# Patient Record
Sex: FEMALE | Race: BLACK OR AFRICAN AMERICAN | Employment: OTHER | URBAN - METROPOLITAN AREA
[De-identification: names, ages, dates, MRNs, and addresses within clinical notes are randomized per-mention and may not be internally consistent; named-entity substitution may affect disease eponyms.]

---

## 2021-03-22 ENCOUNTER — APPOINTMENT (OUTPATIENT)
Dept: CT IMAGING | Facility: CLINIC | Age: 76
End: 2021-03-22
Attending: EMERGENCY MEDICINE
Payer: MEDICAID

## 2021-03-22 ENCOUNTER — HOSPITAL ENCOUNTER (OUTPATIENT)
Facility: CLINIC | Age: 76
Setting detail: OBSERVATION
Discharge: HOME | End: 2021-03-25
Attending: EMERGENCY MEDICINE | Admitting: EMERGENCY MEDICINE
Payer: MEDICAID

## 2021-03-22 ENCOUNTER — APPOINTMENT (OUTPATIENT)
Dept: GENERAL RADIOLOGY | Facility: CLINIC | Age: 76
End: 2021-03-22
Attending: EMERGENCY MEDICINE
Payer: MEDICAID
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
## 2021-03-22 DIAGNOSIS — M25.561 ACUTE PAIN OF RIGHT KNEE: ICD-10-CM

## 2021-03-22 DIAGNOSIS — I10 BENIGN ESSENTIAL HYPERTENSION: Primary | ICD-10-CM

## 2021-03-22 DIAGNOSIS — R55 SYNCOPE, UNSPECIFIED SYNCOPE TYPE: ICD-10-CM

## 2021-03-22 DIAGNOSIS — R53.1 GENERALIZED WEAKNESS: ICD-10-CM

## 2021-03-22 DIAGNOSIS — S09.90XA CLOSED HEAD INJURY, INITIAL ENCOUNTER: ICD-10-CM

## 2021-03-22 DIAGNOSIS — F32.3 SEVERE MAJOR DEPRESSION WITH PSYCHOTIC FEATURES (H): ICD-10-CM

## 2021-03-22 DIAGNOSIS — F33.3 SEVERE RECURRENT DEPRESSION WITH PSYCHOSIS (H): ICD-10-CM

## 2021-03-22 PROBLEM — H47.012 ISCHEMIC OPTIC NEUROPATHY OF LEFT EYE: Status: ACTIVE | Noted: 2017-02-24

## 2021-03-22 PROBLEM — M54.40 CHRONIC LOW BACK PAIN WITH SCIATICA: Status: ACTIVE | Noted: 2017-04-21

## 2021-03-22 PROBLEM — G89.29 CHRONIC LOW BACK PAIN WITH SCIATICA: Status: ACTIVE | Noted: 2017-04-21

## 2021-03-22 PROBLEM — R32 URINARY INCONTINENCE: Status: ACTIVE | Noted: 2017-04-21

## 2021-03-22 PROBLEM — D32.9 MENINGIOMA (H): Status: ACTIVE | Noted: 2017-01-19

## 2021-03-22 PROBLEM — H47.292: Status: ACTIVE | Noted: 2017-02-24

## 2021-03-22 LAB
ALBUMIN SERPL-MCNC: 3.6 G/DL (ref 3.4–5)
ALBUMIN UR-MCNC: NEGATIVE MG/DL
ALP SERPL-CCNC: 64 U/L (ref 40–150)
ALT SERPL W P-5'-P-CCNC: 29 U/L (ref 0–50)
ANION GAP SERPL CALCULATED.3IONS-SCNC: 7 MMOL/L (ref 3–14)
APPEARANCE UR: CLEAR
AST SERPL W P-5'-P-CCNC: 40 U/L (ref 0–45)
BASOPHILS # BLD AUTO: 0 10E9/L (ref 0–0.2)
BASOPHILS NFR BLD AUTO: 0.6 %
BILIRUB SERPL-MCNC: 0.3 MG/DL (ref 0.2–1.3)
BILIRUB UR QL STRIP: NEGATIVE
BUN SERPL-MCNC: 10 MG/DL (ref 7–30)
CALCIUM SERPL-MCNC: 8.6 MG/DL (ref 8.5–10.1)
CHLORIDE SERPL-SCNC: 107 MMOL/L (ref 94–109)
CO2 SERPL-SCNC: 25 MMOL/L (ref 20–32)
COLOR UR AUTO: ABNORMAL
CREAT SERPL-MCNC: 0.46 MG/DL (ref 0.52–1.04)
DIFFERENTIAL METHOD BLD: NORMAL
EOSINOPHIL # BLD AUTO: 0.1 10E9/L (ref 0–0.7)
EOSINOPHIL NFR BLD AUTO: 2.3 %
ERYTHROCYTE [DISTWIDTH] IN BLOOD BY AUTOMATED COUNT: 13.2 % (ref 10–15)
GFR SERPL CREATININE-BSD FRML MDRD: >90 ML/MIN/{1.73_M2}
GLUCOSE SERPL-MCNC: 159 MG/DL (ref 70–99)
GLUCOSE UR STRIP-MCNC: NEGATIVE MG/DL
HCT VFR BLD AUTO: 37.4 % (ref 35–47)
HGB BLD-MCNC: 12 G/DL (ref 11.7–15.7)
HGB UR QL STRIP: NEGATIVE
IMM GRANULOCYTES # BLD: 0 10E9/L (ref 0–0.4)
IMM GRANULOCYTES NFR BLD: 0.4 %
INTERPRETATION ECG - MUSE: NORMAL
KETONES UR STRIP-MCNC: NEGATIVE MG/DL
LABORATORY COMMENT REPORT: NORMAL
LEUKOCYTE ESTERASE UR QL STRIP: NEGATIVE
LYMPHOCYTES # BLD AUTO: 1.2 10E9/L (ref 0.8–5.3)
LYMPHOCYTES NFR BLD AUTO: 24.7 %
MAGNESIUM SERPL-MCNC: 2.4 MG/DL (ref 1.6–2.3)
MCH RBC QN AUTO: 27.4 PG (ref 26.5–33)
MCHC RBC AUTO-ENTMCNC: 32.1 G/DL (ref 31.5–36.5)
MCV RBC AUTO: 85 FL (ref 78–100)
MONOCYTES # BLD AUTO: 0.6 10E9/L (ref 0–1.3)
MONOCYTES NFR BLD AUTO: 11.9 %
NEUTROPHILS # BLD AUTO: 2.9 10E9/L (ref 1.6–8.3)
NEUTROPHILS NFR BLD AUTO: 60.1 %
NITRATE UR QL: NEGATIVE
NRBC # BLD AUTO: 0 10*3/UL
NRBC BLD AUTO-RTO: 0 /100
PH UR STRIP: 7 PH (ref 5–7)
PLATELET # BLD AUTO: 255 10E9/L (ref 150–450)
POTASSIUM SERPL-SCNC: 4.5 MMOL/L (ref 3.4–5.3)
PROT SERPL-MCNC: 7.8 G/DL (ref 6.8–8.8)
RBC # BLD AUTO: 4.38 10E12/L (ref 3.8–5.2)
RBC #/AREA URNS AUTO: 5 /HPF (ref 0–2)
SARS-COV-2 RNA RESP QL NAA+PROBE: NEGATIVE
SODIUM SERPL-SCNC: 139 MMOL/L (ref 133–144)
SOURCE: ABNORMAL
SP GR UR STRIP: 1.01 (ref 1–1.03)
SPECIMEN SOURCE: NORMAL
SQUAMOUS #/AREA URNS AUTO: 0 /HPF (ref 0–1)
TROPONIN I SERPL-MCNC: <0.015 UG/L (ref 0–0.04)
UROBILINOGEN UR STRIP-MCNC: 0 MG/DL (ref 0–2)
WBC # BLD AUTO: 4.9 10E9/L (ref 4–11)
WBC #/AREA URNS AUTO: 4 /HPF (ref 0–5)

## 2021-03-22 PROCEDURE — 84484 ASSAY OF TROPONIN QUANT: CPT | Performed by: EMERGENCY MEDICINE

## 2021-03-22 PROCEDURE — 83735 ASSAY OF MAGNESIUM: CPT | Performed by: EMERGENCY MEDICINE

## 2021-03-22 PROCEDURE — 250N000013 HC RX MED GY IP 250 OP 250 PS 637: Performed by: STUDENT IN AN ORGANIZED HEALTH CARE EDUCATION/TRAINING PROGRAM

## 2021-03-22 PROCEDURE — G0378 HOSPITAL OBSERVATION PER HR: HCPCS

## 2021-03-22 PROCEDURE — 99285 EMERGENCY DEPT VISIT HI MDM: CPT | Mod: 25

## 2021-03-22 PROCEDURE — 81001 URINALYSIS AUTO W/SCOPE: CPT | Performed by: EMERGENCY MEDICINE

## 2021-03-22 PROCEDURE — 87635 SARS-COV-2 COVID-19 AMP PRB: CPT | Performed by: EMERGENCY MEDICINE

## 2021-03-22 PROCEDURE — 80053 COMPREHEN METABOLIC PANEL: CPT | Performed by: EMERGENCY MEDICINE

## 2021-03-22 PROCEDURE — 99220 PR INITIAL OBSERVATION CARE,LEVEL III: CPT | Performed by: STUDENT IN AN ORGANIZED HEALTH CARE EDUCATION/TRAINING PROGRAM

## 2021-03-22 PROCEDURE — 85025 COMPLETE CBC W/AUTO DIFF WBC: CPT | Performed by: EMERGENCY MEDICINE

## 2021-03-22 PROCEDURE — 70450 CT HEAD/BRAIN W/O DYE: CPT

## 2021-03-22 PROCEDURE — C9803 HOPD COVID-19 SPEC COLLECT: HCPCS

## 2021-03-22 PROCEDURE — 73562 X-RAY EXAM OF KNEE 3: CPT | Mod: RT

## 2021-03-22 PROCEDURE — 93005 ELECTROCARDIOGRAM TRACING: CPT

## 2021-03-22 RX ORDER — IBUPROFEN 600 MG/1
600 TABLET, FILM COATED ORAL EVERY 6 HOURS PRN
COMMUNITY

## 2021-03-22 RX ORDER — ONDANSETRON 4 MG/1
4 TABLET, ORALLY DISINTEGRATING ORAL EVERY 6 HOURS PRN
Status: DISCONTINUED | OUTPATIENT
Start: 2021-03-22 | End: 2021-03-25 | Stop reason: HOSPADM

## 2021-03-22 RX ORDER — ACETAMINOPHEN 325 MG/1
650 TABLET ORAL EVERY 4 HOURS PRN
Status: DISCONTINUED | OUTPATIENT
Start: 2021-03-22 | End: 2021-03-25 | Stop reason: HOSPADM

## 2021-03-22 RX ORDER — ACETAMINOPHEN 650 MG/1
650 SUPPOSITORY RECTAL EVERY 4 HOURS PRN
Status: DISCONTINUED | OUTPATIENT
Start: 2021-03-22 | End: 2021-03-25 | Stop reason: HOSPADM

## 2021-03-22 RX ORDER — NITROGLYCERIN 0.4 MG/1
0.4 TABLET SUBLINGUAL EVERY 5 MIN PRN
Status: DISCONTINUED | OUTPATIENT
Start: 2021-03-22 | End: 2021-03-25 | Stop reason: HOSPADM

## 2021-03-22 RX ORDER — CHLORAL HYDRATE 500 MG
1 CAPSULE ORAL DAILY
COMMUNITY

## 2021-03-22 RX ORDER — GABAPENTIN 300 MG/1
300 CAPSULE ORAL 3 TIMES DAILY
COMMUNITY

## 2021-03-22 RX ORDER — AMLODIPINE BESYLATE 5 MG/1
5 TABLET ORAL DAILY
Status: DISCONTINUED | OUTPATIENT
Start: 2021-03-22 | End: 2021-03-25

## 2021-03-22 RX ORDER — CHOLECALCIFEROL (VITAMIN D3) 50 MCG
1 TABLET ORAL DAILY
COMMUNITY

## 2021-03-22 RX ORDER — LIDOCAINE 40 MG/G
CREAM TOPICAL
Status: DISCONTINUED | OUTPATIENT
Start: 2021-03-22 | End: 2021-03-25 | Stop reason: HOSPADM

## 2021-03-22 RX ORDER — ONDANSETRON 2 MG/ML
4 INJECTION INTRAMUSCULAR; INTRAVENOUS EVERY 6 HOURS PRN
Status: DISCONTINUED | OUTPATIENT
Start: 2021-03-22 | End: 2021-03-25 | Stop reason: HOSPADM

## 2021-03-22 RX ADMIN — AMLODIPINE BESYLATE 5 MG: 5 TABLET ORAL at 19:41

## 2021-03-22 RX ADMIN — ACETAMINOPHEN 650 MG: 325 TABLET, FILM COATED ORAL at 17:25

## 2021-03-22 ASSESSMENT — ENCOUNTER SYMPTOMS
FEVER: 0
SHORTNESS OF BREATH: 0
CONFUSION: 1
MYALGIAS: 1
VOMITING: 0
DIARRHEA: 0
SEIZURES: 0

## 2021-03-22 NOTE — PLAN OF CARE
Observation goals PRIOR TO DISCHARGE     Comments: List all  goals to be met before discharge:   - Diagnostic tests and consults completed and resulted: NOT MET, ECHO scheduled    - No further episodes of syncope and any new arrhythmia addressed with controlled heart rates: NOT MET, Tele: SR  - Vital signs normal or at patient baseline and orthostatic vitals are normal and patient not lightheaded with standing:NOT MET   orthostatic BP abnormal  Lying 163/91 Pulse 65  Sitting 157/89 Pulse 63   Standing 201/107 Pulse 71  Reports dizziness when standing    - Tolerating oral intake to maintain hydration: MET    - Safe disposition plan has been identified: NOT MET   - Nurse to notify provider when observation goals have been met and patient is ready for discharge.

## 2021-03-22 NOTE — PROGRESS NOTES
RECEIVING UNIT ED HANDOFF REVIEW    ED Nurse Handoff Report was reviewed by: Etta Gonzalez RN on March 22, 2021 at 2:03 PM

## 2021-03-22 NOTE — ED TRIAGE NOTES
Comes from airport, plan to fly to [facility] today. Fell out of wheelchair, syncopal episode per daughter report. Pt was in WC when EMS arrived. , VSS on route.

## 2021-03-22 NOTE — ED PROVIDER NOTES
"  History   Chief Complaint:  Loss of Consciousness       The history is provided by the patient and a relative. The history is limited by a language barrier. A  was used.      Felton Kenny is a 75 year old female with history of blindness in left eye, chronic back pain, and depression who presents after a syncopal episode. The patient states that she was at the airport about the fly to [facility] when she had a syncopal episode while in a wheel chair. She did not hit her head or fall to the ground. She complains that she \"did not know where she went\" and \"her eye could not see\". Unclear which eye it was. She does have a history of blindness in her left eye.  Felton's daughter reports that she had a similar episode three days ago, where she injured her right knee and hit her head. She lost bladder control today. The daughter states that she is frequently \"possessed by the devil during theses spells\" and is supposed to follow with a neurologist, but has not treated with them in three years. Felton denies any seizure, fever, cough, diarrhea, or shortness of breath. She is not anticoagulated. Patient language is Marielos.     Review of Systems   Constitutional: Negative for fever.   Eyes: Positive for visual disturbance.   Respiratory: Negative for shortness of breath.    Gastrointestinal: Negative for diarrhea and vomiting.   Musculoskeletal: Positive for myalgias.   Neurological: Positive for syncope. Negative for seizures.   Psychiatric/Behavioral: Positive for confusion.   All other systems reviewed and are negative.    Allergies:  No known drug allergies     Medications:  Zoloft   Risperdal   Vitamin D3  Oxybutynin   Drisdol   Gabapentin     Past Medical History:    Depression   Blindness in left eye   Urinary incontinence   Vitamin d deficiency   Chronic back pain   Chronic constipation     Social History:  Presents to emergency department with daughter   Speaks [place]     Physical Exam "     Patient Vitals for the past 24 hrs:   BP Temp Temp src Pulse Resp SpO2   03/22/21 1300 (!) 158/92 -- -- 67 -- 97 %   03/22/21 1230 (!) 165/100 -- -- 67 20 96 %   03/22/21 1200 (!) 158/105 -- -- 73 16 98 %   03/22/21 1130 (!) 160/89 -- -- 66 19 98 %   03/22/21 1100 (!) 142/89 -- -- 66 21 99 %   03/22/21 1030 (!) 159/104 -- -- 66 19 97 %   03/22/21 1000 (!) 147/89 -- -- 64 20 96 %   03/22/21 0930 (!) 146/92 -- -- 68 18 96 %   03/22/21 0900 (!) 166/91 -- -- 67 22 98 %   03/22/21 0802 -- 98.3  F (36.8  C) Temporal -- -- --   03/22/21 0800 (!) 158/90 -- -- 70 21 98 %   03/22/21 0755 (!) 158/90 -- -- 72 24 98 %       Physical Exam  General: Well-nourished, appears to be resting comfortably when I enter the room  Eyes: PERRL, conjunctivae pink no scleral icterus or conjunctival injection  ENT:  Moist mucus membranes, posterior oropharynx clear without erythema or exudates  Respiratory:  Lungs clear to auscultation bilaterally, no crackles/rubs/wheezes.  Good air movement  CV: Normal rate and rhythm, no murmurs/rubs/gallops  GI:  Abdomen soft and non-distended.  Normoactive BS.  No tenderness, guarding or rebound  Skin: Warm, dry.  No rashes or petechiae  Musculoskeletal: No peripheral edema or calf tenderness  Neuro: Alert and oriented to person/place/time. PERRL, EOMI no nystagmus, no aphasia/facial droop/dysarthria, tongue midline, symmetric palatal elevation, normal strength at SCM/trapezius/BUE/BLE, normal coordination to FNF at BUE, gait deferred negative romberg, sensation intact to LT over face/BUE/BLE  Psychiatric: Normal affect      Emergency Department Course   ECG  ECG taken at 08:58:26, ECG read at 0929  Normal sinus rhythm, minimal voltage criteria for LVH, may be normal variant. Borderline ECG.   Rate 68 bpm. AR interval 192 ms. QRS duration 90 ms. QT/QTc 446/474 ms. P-R-T axes 27 38 52.     Imaging:  Head CT w/o contrast  IMPRESSION:   1. 2.5 cm rim calcified nodule arising from the base of the  skull  between the left sphenoid ridge and left anterior clinoid process.  While this most likely represents a partially calcified meningioma,  rim calcified large cerebral artery aneurysm cannot be completely  excluded.  2. Diffuse cerebral volume loss and cerebral white matter changes  consistent with chronic small vessel ischemic disease. No evidence for  acute intracranial pathology.   As read by Radiology.     XR Knee Right 3 Views  IMPRESSION:  1.  No fracture or joint malalignment.  2.  Mild-moderate right knee degenerative arthrosis. This includes  mild medial compartment narrowing, medial compartment osteophytosis,  and patellofemoral compartment osteophytosis.  3.  Small knee joint effusion.  4.  Bone demineralization.  As read by Radiology.     Laboratory:  CBC: WBC: 4.9, HGB: 12.0, PLT: 255  XMP: Glucose 159 (H), Creatinine: 0.46 (L) o/w WNL  UA: unable to obtain  Troponin (Collected 0800): <0.015  Magnesium: 2.4 (H)   Asymptomatic COVID-19 Virus by PCR Nasopharyngeal swab: pending     Emergency Department Course:    Reviewed:  I reviewed nursing notes, vitals, past medical history and care everywhere    Assessments:  0743 I obtained history and examined the patient as noted above.   1150 I rechecked the patient. Her daughter was not in the room.   1229 I rechecked the patient.     Consults:   1259 I discussed the patient with Dr. Lipscomb, hospitalist, who accepted the patient     Disposition:  The patient was admitted to the hospital under the care of Dr. Lipscomb.     Impression & Plan     Medical Decision Making:  Felton Kenny is a 75 year old female who presents with syncope and loss of bladder control. Her workup is reassuring but given her age as risk factor for a malignant cause of syncope along with difficulties with translation and obtaining a good history, we will admit for observation to be sure no malignant arrhthymias or other concerning symptoms.  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 No other intracranial hemorrhage or skull fractures. No apparent fractures. Per her daughter, she hasn't gone to the doctor in years and wants to establish care with a new doctor. Dr. Lipscomb accepted the patient to his service.    Covid-19  Felton Kenny was evaluated during a global COVID-19 pandemic, which necessitated consideration that the patient might be at risk for infection with the SARS-CoV-2 virus that causes COVID-19.   Applicable protocols for evaluation were followed during the patient's care.   COVID-19 was considered as part of the patient's evaluation. The plan for testing is:  a test was obtained during this visit.    Diagnosis:    ICD-10-CM    1. Syncope, unspecified syncope type  R55 Asymptomatic SARS-CoV-2 COVID-19 Virus (Coronavirus) by PCR   2. Fall, initial encounter  W19.XXXA    3. Closed head injury, initial encounter  S09.90XA    4. Acute pain of right knee  M25.561        Discharge Medications:  New Prescriptions    No medications on file       Scribe Disclosure:  Blanca MIXON, am serving as a scribe at 7:43 AM on 3/22/2021 to document services personally performed by Marianna Taylor MD based on my observations and the provider's statements to me.          Marianna Taylor MD  03/22/21 [id number]

## 2021-03-22 NOTE — LETTER
"Transition Communication Hand-off for Care Transitions to Next Level of Care Provider    Name: Felton Kenny  : 1945  MRN #: [id number]  Primary Care Provider: Estee Yeager     Primary Clinic: [facility] [address] [place] [address]     Reason for Hospitalization:  Closed head injury, initial encounter [S09.90XA]  Fall, initial encounter [W19.XXXA]  Acute pain of right knee [M25.561]  Syncope, unspecified syncope type [R55]  Admit Date/Time: 3/22/2021  7:38 AM  Discharge Date: 3/25/2021  Payor Source: No coverage found.             Reason for Communication Hand-off Referral: Other Needs f/u on Home Care Referral:  The pt was not accepted by [facility] due to her mental health issues.  The pt's son-in-law is aware of this and the fact we are placing other referral for home care.  I am sending a handoff to the pt's PCP to help f/u with home care referrals if needed.  I faxed a referral to [facility] and spoke with Farida  [phone] and fax [phone].  I have not hear back from her and I LVM with her and faxed the home care Referral Discharge via [employer].    The pt is discharging to home with her TWIN.  Her dtg who flew to [facility] at the time this pt had her [place] the [place] has not called me.  The plan was for her to call me and update me on \"home Care/PCA\" services the TWIN is stating the pt was receiving up until a year ago.  He states these services stopped because of covid.  As stated in my other not the TWIN, the pt's PCP, and no other family members except her dtg who flew to [place] know who provided these services.    Discharge Plan:  Discharge Plan:      Most Recent Value   Disposition Comments  The pt's dtg that manages her cares and knows the pt's  and insurance is in [facility].           Concern for non-adherence with plan of care:   Y/N yes  Discharge Needs Assessment:  Needs      Most Recent Value   Equipment Currently Used at Home  walker, rolling, " "wheelchair, manual   # of Referrals Placed by CTS  Communication hand-offs to next level of Care Providers, Financial Services, Homecare            Follow-up specialty is recommended: No    Follow-up plan:  No future appointments.    Any outstanding tests or procedures:        Referrals     Future Labs/Procedures    Home care nursing referral     Comments:    RN skilled nursing visit. RN to assess vital signs and weight, respiratory and cardiac status, hydration, nutrition and bowel status and home safety.    Your provider has ordered home care nursing services. If you have not been contacted within 2 days of your discharge please call the inpatient department phone number at [phone] .    Home Care PT Referral for Hospital Discharge     Comments:    PT to eval and treat    Your provider has ordered home care - physical therapy. If you have not been contacted within 2 days of your discharge please call the department phone number listed on the top of this document.            Key Recommendations:   As stated above needs follow through with home care referral.  The pt was not accepted by [facility] due to her mental health issues.  The pt's son-in-law is aware of this and the fact we are placing other referral for home care.  I am sending a handoff to the pt's PCP to help f/u with home care referrals if needed.  I faxed a referral to [facility] and spoke with Farida,  [phone] and fax [phone].  I have not hear back from her and I LVM with her and faxed the home care Referral Discharge via [employer].    The pt is discharging to home with her TWIN.  Her dtg who flew to [facility] at the time this pt [place] her [place] has not called me.  The plan was for her to call me and update me on \"home Care/PCA\" services the TWIN is stating the pt was receiving up until a year ago.  He states these services stopped because of covid.  As stated in my other not the TWIN, the pt's PCP, and no other family members " except her dtg who flew to [place] know who provided these services.      Thank you,  Care Coordination    Juana Veloz RN, CC    AVS/Discharge Summary is the source of truth; this is a helpful guide for improved communication of patient story

## 2021-03-22 NOTE — ED NOTES
[facility]  ED Nurse Handoff Report    ED Chief complaint: Loss of Consciousness      ED Diagnosis:   Final diagnoses:   None       Code Status: To be addressed by admitting provider    Allergies: No Known Allergies    Patient Story: syncopal episode from WC to ground at airport, was planning on flying to [place]. .     Focused Assessment: Pt speaks Ruddy, daughter assisted w/intepretation alongside  service. Denies any neuro symptoms in ED. Baseline wheelchair use. Hypertensive in ED, otherwise VSS. Calm, cooperative and resting on cart.    Labs Ordered and Resulted from Time of ED Arrival Up to the Time of Departure from the ED   COMPREHENSIVE METABOLIC PANEL - Abnormal; Notable for the following components:       Result Value    Glucose 159 (*)     Creatinine 0.46 (*)     All other components within normal limits   MAGNESIUM - Abnormal; Notable for the following components:    Magnesium 2.4 (*)     All other components within normal limits   CBC WITH PLATELETS DIFFERENTIAL   TROPONIN I   ROUTINE UA WITH MICROSCOPIC REFLEX TO CULTURE   VITAL SIGNS   PULSE OXIMETRY NURSING   CARDIAC CONTINUOUS MONITORING   PERIPHERAL IV CATHETER     CT Head w/o Contrast   Final Result   IMPRESSION:    1. 2.5 cm rim calcified nodule arising from the base of the skull   between the left sphenoid ridge and left anterior clinoid process.   While this most likely represents a partially calcified meningioma,   rim calcified large cerebral artery aneurysm cannot be completely   excluded.   2. Diffuse cerebral volume loss and cerebral white matter changes   consistent with chronic small vessel ischemic disease. No evidence for   acute intracranial pathology.                   KEELEY TOLBERT MD      XR Knee Right 3 Views   Final Result   IMPRESSION:   1.  No fracture or joint malalignment.   2.  Mild-moderate right knee degenerative arthrosis. This includes   mild medial compartment narrowing, medial  compartment osteophytosis,   and patellofemoral compartment osteophytosis.   3.  Small knee joint effusion.   4.  Bone demineralization.      GIUSEPPE CALDWELL MD           Treatments and/or interventions provided: labs, XR knee, CT head  Patient's response to treatments and/or interventions: resting    To be done/followed up on inpatient unit:  Continue with plan of care per admitting MD.    Does this patient have any cognitive concerns?: None    Activity level - Baseline/Home:  Wheelchair  Activity Level - Current:   Not assessed in ED    Patient's Preferred language: Tigrina   Needed?: No    Isolation: None  Infection: Not Applicable  Patient tested for COVID 19 prior to admission: YES  Bariatric?: No    Vital Signs:   Vitals:    03/22/21 1100 03/22/21 1130 03/22/21 1200 03/22/21 1230   BP: (!) 142/89 (!) 160/89 (!) 158/105 (!) 165/100   Pulse: 66 66 73 67   Resp: 21 19 16 20   Temp:       TempSrc:       SpO2: 99% 98% 98% 96%       Cardiac Rhythm:     Was the PSS-3 completed:   Yes  What interventions are required if any?               Family Comments: Daughter at bedside  OBS brochure/video discussed/provided to patient/family: Yes, daughter       For the majority of the shift this patient's behavior was Green.   Behavioral interventions performed were NA.    ED NURSE PHONE NUMBER: *[phone]

## 2021-03-22 NOTE — PHARMACY-ADMISSION MEDICATION HISTORY
Pharmacy Medication History  Admission medication history interview status for the 3/22/2021  admission is complete. See EPIC admission navigator for prior to admission medications     Location of Interview: Phone  Medication history sources: Patient and Care Everywhere    Significant changes made to the medication list:      In the past week, patient estimated taking medication this percent of the time: less than 50%    Additional medication history information:   --  Pt is supposed to be on oxybutynin XL 5 mg po every day, sertraline 50 mg po every day, risperidone 2 mg po at bedtime and gabapentin 300 mg po TID (per [employer] records and confirmed w/ pt's daughter).  However, she had stopped taking these ~ 1 year ago b/c she no longer has insurance.  She is still able to to take gabapentin b/c her daughter is on the same medication and is sharing with her.  Pt still has ibuprofen 600 mg prescribed from [facility].    --  Pt also uses a cream for pain but her daughter is unsure of the name.  She will update us when able (she has it at home).    Medication reconciliation completed by provider prior to medication history? No    Time spent in this activity: 30 minutes    Prior to Admission medications    Medication Sig Last Dose Taking? Auth Provider   diphenhydrAMINE-acetaminophen (TYLENOL PM)  MG tablet Take 1 tablet by mouth At Bedtime Past Week Yes Unknown, Entered By History   fish oil-omega-3 fatty acids 1000 MG capsule Take 1 g by mouth daily Past Week Yes Unknown, Entered By History   gabapentin (NEURONTIN) 300 MG capsule Take 300 mg by mouth 3 times daily Past Week Yes Unknown, Entered By History   ibuprofen (ADVIL/MOTRIN) 600 MG tablet Take 600 mg by mouth every 6 hours as needed for moderate pain prn Yes Unknown, Entered By History   vitamin D3 (CHOLECALCIFEROL) 50 mcg (2000 units) tablet Take 1 tablet by mouth daily Past Week Yes Unknown, Entered By History       The information provided in  this note is only as accurate as the sources available at the time of update(s)

## 2021-03-22 NOTE — H&P
[facility]    History and Physical - Hospitalist Service       Date of Admission:  3/22/2021    Assessment & Plan   Felton Kenny is a 75 year old female admitted on 3/22/2021. She presents with syncope.       Syncope, unspecified syncope type    Assessment: Presents with episode of [place] the airport [place] the [place] of admission.  Work-up on admission largely benign, with an EKG that shows normal sinus rhythm, her CT head was without any acute intracranial pathology.  Hemodynamically stable. Suspect likely vaso-vagal episode. Low suspicion for seizure. ACS unlikely.     Plan:   - admit to observation  - Telemetry  - ECHO  - Check orthostatics  - Follow vitals/temp    HTN  Assessment/Plan: start norvasc 5 mg, SBPs consistently very high here.      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  Entered: Raji Lipscomb MD 03/22/2021, 7:54 PM     The patient's care was discussed with the Patient and ED Provider.    Raji Lipscomb MD  [facility]  Contact information available via [facility] Paging/Directory      ______________________________________________________________________    Chief Complaint     Syncope    History is obtained from the patient    History of Present Illness   Felton Kenny is a 75 year old female with past medical history of left eye blindness, chronic back pain, major depression who presents for an episode of  syncope.    Patient was at the airport today when she was certified to [facility] when she had a syncopal episode while being in a wheelchair.  She did not fall to the ground, there was no head trauma.  She reported that following her episode, she did not know where she was for a brief second.  She does have a history of blindness in her left eye.  Her daughter reports that the patient had an similar episode roughly 3 days ago.  She did sustain a fall at that time and did hit her head.  Otherwise during her syncopal episode, she did lose control of her bladder.  Otherwise there was no reports of any seizure-like activity, no recent fevers or chills by the patient, no nausea/vomiting abdominal pain.  She not on anticoagulation.  She denies any chest pain at this time.  She has no other complaints this time.    Review of Systems    The 10 point Review of Systems is negative other than noted in the HPI or here.     Past Medical History    I have reviewed this patient's medical history and updated it with pertinent information if needed.   Past Medical History:   Diagnosis Date     Benign essential hypertension      Meningioma (H)        Past Surgical History   I have reviewed this patient's surgical history and updated it with pertinent information if needed.  No past surgical history on file.    Social History   I have reviewed this patient's social history and updated it with pertinent information if needed.  Social History     Tobacco Use     Smoking status: None   Substance Use Topics     Alcohol use: None     Drug use: None       Family History   I have reviewed this patient's family history and updated it with pertinent information if needed.  Family History   Problem Relation Age of Onset     No Known Problems Mother      No Known Problems Father      Prior to Admission Medications   Prior to Admission Medications   Prescriptions Last Dose Informant Patient Reported? Taking?   diphenhydrAMINE-acetaminophen  (TYLENOL PM)  MG tablet Past Week Daughter Yes Yes   Sig: Take 1 tablet by mouth At Bedtime   fish oil-omega-3 fatty acids 1000 MG capsule Past Week Daughter Yes Yes   Sig: Take 1 g by mouth daily   gabapentin (NEURONTIN) 300 MG capsule Past Week Daughter Yes Yes   Sig: Take 300 mg by mouth 3 times daily   ibuprofen (ADVIL/MOTRIN) 600 MG tablet prn Daughter Yes Yes   Sig: Take 600 mg by mouth every 6 hours as needed for moderate pain   vitamin D3 (CHOLECALCIFEROL) 50 mcg (2000 units) tablet Past Week Daughter Yes Yes   Sig: Take 1 tablet by mouth daily      Facility-Administered Medications: None     Allergies   No Known Allergies    Physical Exam   Vital Signs: Temp: 98.8  F (37.1  C) Temp src: Oral BP: (!) 163/95 Pulse: 67   Resp: 20 SpO2: 98 % O2 Device: None (Room air)    Weight: 0 lbs 0 oz    Constitutional: awake, alert, cooperative, no apparent distress.   Eyes: Lids and lashes normal, pupils equal, round and reactive to light   ENT: Normocephalic, without obvious abnormality, atraumatic, sinuses nontender on palpation   Hematologic / Lymphatic: no cervical lymphadenopathy   Respiratory: CTABL   Cardiovascular: RRR with no m/r/g   GI: Normal bowel sounds, soft, non-distended, non-tender.   Skin: normal skin color, texture, turgor   Musculoskeletal: There is no redness, warmth, or swelling of the joints. Full range of motion noted.   Neurologic: Awake, alert, oriented to name, place and time. Cranial nerves II-XII are grossly intact. Motor is 5 out of 5 bilaterally. Sensory is intact.   Neuropsychiatric: normal mood and affect      Data   Data reviewed today: I reviewed all medications, new labs and imaging results over the last 24 hours. I personally reviewed the EKG tracing showing NSR, the head CT image(s) showing see below and the Knee XR image(s) showing see below.    Most Recent 3 CBC's:  Recent Labs   Lab Test 03/22/21  0800   WBC 4.9   HGB 12.0   MCV 85        Most Recent 3 BMP's:  Recent  Labs   Lab Test 03/22/21 0800      POTASSIUM 4.5   CHLORIDE 107   CO2 25   BUN 10   CR 0.46*   ANIONGAP 7   CORINNE 8.6   *     Most Recent 2 LFT's:  Recent Labs   Lab Test 03/22/21 0800   AST 40   ALT 29   ALKPHOS 64   BILITOTAL 0.3     Most Recent 3 INR's:No lab results found.  Most Recent 3 Troponin's:  Recent Labs   Lab Test 03/22/21 0800   TROPI <0.015     Recent Results (from the past 24 hour(s))   XR Knee Right 3 Views    Narrative    KNEE RIGHT THREE VIEWS March 22, 2021 8:46 AM     INDICATION: Right knee pain after a fall.     COMPARISON: None.      Impression    IMPRESSION:  1.  No fracture or joint malalignment.  2.  Mild-moderate right knee degenerative arthrosis. This includes  mild medial compartment narrowing, medial compartment osteophytosis,  and patellofemoral compartment osteophytosis.  3.  Small knee joint effusion.  4.  Bone demineralization.    GIUSEPPE CALDWELL MD   CT Head w/o Contrast    Narrative    CT OF THE HEAD WITHOUT CONTRAST March 22, 2021 8:52 AM     HISTORY: Head trauma, minor (Age >= 65y).    TECHNIQUE: 5 mm thick axial CT images of the head were acquired  without IV contrast material. Radiation dose for this scan was reduced  using automated exposure control, adjustment of the mA and/or kV  according to patient size, or iterative reconstruction technique.    COMPARISON: None available.    FINDINGS: There is an ovoid 2.5 cm rim calcified extra-axial nodule  arising from the junction between the left sphenoid ridge and left  anterior clinoid process that most likely represents a partially  calcified meningioma; however, a rim calcified cerebral artery  aneurysm cannot be completely excluded. There is mild diffuse cerebral  volume loss. There are subtle patchy areas of decreased density in the  cerebral white matter bilaterally that are consistent with sequela of  chronic small vessel ischemic disease. The ventricles and basal  cisterns are within normal limits in  configuration given the degree of  cerebral volume loss.  There is no midline shift. There are no  extra-axial fluid collections.    No intracranial hemorrhage or recent infarct.    The visualized paranasal sinuses are well-aerated. There is no  mastoiditis. There are no fractures of the visualized bones.      Impression    IMPRESSION:   1. 2.5 cm rim calcified nodule arising from the base of the skull  between the left sphenoid ridge and left anterior clinoid process.  While this most likely represents a partially calcified meningioma,  rim calcified large cerebral artery aneurysm cannot be completely  excluded.  2. Diffuse cerebral volume loss and cerebral white matter changes  consistent with chronic small vessel ischemic disease. No evidence for  acute intracranial pathology.             KEELEY TOLBERT MD

## 2021-03-22 NOTE — PROVIDER NOTIFICATION
MD Notification    Notified Person: MD    Notified Person Name: Dr. Lipscomb    Notification Date/Time:3/22/21 173    Notification Interaction:web page    Purpose of Notification:    orthostatic BP abnormal  163/91 Pulse 65  157/89 Pulse 63   201/107 Pulse 71     Orders Received:    Comments:

## 2021-03-23 ENCOUNTER — APPOINTMENT (OUTPATIENT)
Dept: CARDIOLOGY | Facility: CLINIC | Age: 76
End: 2021-03-23
Attending: STUDENT IN AN ORGANIZED HEALTH CARE EDUCATION/TRAINING PROGRAM
Payer: MEDICAID

## 2021-03-23 ENCOUNTER — HOSPITAL ENCOUNTER (OUTPATIENT)
Dept: NEUROLOGY | Facility: CLINIC | Age: 76
End: 2021-03-23
Attending: PSYCHIATRY & NEUROLOGY
Payer: MEDICAID

## 2021-03-23 LAB
ANION GAP SERPL CALCULATED.3IONS-SCNC: 3 MMOL/L (ref 3–14)
BUN SERPL-MCNC: 9 MG/DL (ref 7–30)
CALCIUM SERPL-MCNC: 8.1 MG/DL (ref 8.5–10.1)
CHLORIDE SERPL-SCNC: 107 MMOL/L (ref 94–109)
CO2 SERPL-SCNC: 29 MMOL/L (ref 20–32)
CREAT SERPL-MCNC: 0.52 MG/DL (ref 0.52–1.04)
ERYTHROCYTE [DISTWIDTH] IN BLOOD BY AUTOMATED COUNT: 13.4 % (ref 10–15)
GFR SERPL CREATININE-BSD FRML MDRD: >90 ML/MIN/{1.73_M2}
GLUCOSE SERPL-MCNC: 124 MG/DL (ref 70–99)
HCT VFR BLD AUTO: 37.2 % (ref 35–47)
HGB BLD-MCNC: 11.8 G/DL (ref 11.7–15.7)
MCH RBC QN AUTO: 27.1 PG (ref 26.5–33)
MCHC RBC AUTO-ENTMCNC: 31.7 G/DL (ref 31.5–36.5)
MCV RBC AUTO: 85 FL (ref 78–100)
PLATELET # BLD AUTO: 240 10E9/L (ref 150–450)
POTASSIUM SERPL-SCNC: 3.4 MMOL/L (ref 3.4–5.3)
RBC # BLD AUTO: 4.36 10E12/L (ref 3.8–5.2)
SODIUM SERPL-SCNC: 139 MMOL/L (ref 133–144)
TROPONIN I SERPL-MCNC: <0.015 UG/L (ref 0–0.04)
WBC # BLD AUTO: 3.8 10E9/L (ref 4–11)

## 2021-03-23 PROCEDURE — 95816 EEG AWAKE AND DROWSY: CPT

## 2021-03-23 PROCEDURE — 250N000013 HC RX MED GY IP 250 OP 250 PS 637: Performed by: STUDENT IN AN ORGANIZED HEALTH CARE EDUCATION/TRAINING PROGRAM

## 2021-03-23 PROCEDURE — G0378 HOSPITAL OBSERVATION PER HR: HCPCS

## 2021-03-23 PROCEDURE — 93306 TTE W/DOPPLER COMPLETE: CPT | Mod: 26 | Performed by: INTERNAL MEDICINE

## 2021-03-23 PROCEDURE — 80048 BASIC METABOLIC PNL TOTAL CA: CPT | Performed by: STUDENT IN AN ORGANIZED HEALTH CARE EDUCATION/TRAINING PROGRAM

## 2021-03-23 PROCEDURE — 36415 COLL VENOUS BLD VENIPUNCTURE: CPT | Performed by: STUDENT IN AN ORGANIZED HEALTH CARE EDUCATION/TRAINING PROGRAM

## 2021-03-23 PROCEDURE — 93306 TTE W/DOPPLER COMPLETE: CPT

## 2021-03-23 PROCEDURE — 85027 COMPLETE CBC AUTOMATED: CPT | Performed by: STUDENT IN AN ORGANIZED HEALTH CARE EDUCATION/TRAINING PROGRAM

## 2021-03-23 PROCEDURE — 84484 ASSAY OF TROPONIN QUANT: CPT | Performed by: STUDENT IN AN ORGANIZED HEALTH CARE EDUCATION/TRAINING PROGRAM

## 2021-03-23 PROCEDURE — 99225 PR SUBSEQUENT OBSERVATION CARE,LEVEL II: CPT | Performed by: INTERNAL MEDICINE

## 2021-03-23 PROCEDURE — 99207 PR CDG-CODE CATEGORY CHANGED: CPT | Performed by: INTERNAL MEDICINE

## 2021-03-23 RX ADMIN — ACETAMINOPHEN 650 MG: 325 TABLET, FILM COATED ORAL at 11:19

## 2021-03-23 RX ADMIN — AMLODIPINE BESYLATE 5 MG: 5 TABLET ORAL at 11:01

## 2021-03-23 NOTE — PLAN OF CARE
DATE & TIME: 3/23/21 9684-6779   Cognitive Concerns/ Orientation : AOx2   BEHAVIOR & AGGRESSION TOOL COLOR: Green  CIWA SCORE: NA   ABNL VS/O2: HTN, other VSS  MOBILITY: Ax1 w/GB  PAIN MANAGMENT: tylenol x1  DIET: Reg -- Vegan  BOWEL/BLADDER: Cont  ABNL LAB/BG: Ca 8.1, Glu 124, WBC 3.8  DRAIN/DEVICES: L hand PIV  TELEMETRY RHYTHM: NSR  SKIN: WDL  TESTS/PROCEDURES: ECHO results pending, neuro consult, psych consult  D/C DAY/GOALS/PLACE: pending pt progress  OTHER IMPORTANT INFO:   Observation goals PRIOR TO DISCHARGE    Comments: List all  goals to be met before discharge:   - Diagnostic tests and consults completed and resulted -NOT MET  - No further episodes of syncope and any new  arrhythmia addressed with controlled heart rates -MET  - Vital signs normal or at patient baseline and orthostatic vitals are normal and patient not lightheaded with standing -MET  - Tolerating oral intake to maintain hydration -MET  - Safe disposition plan has been identified -NOT MET

## 2021-03-23 NOTE — PROGRESS NOTES
EEG portable [id number]   Dr Ledezma ordering   assist and family in room.  Cooperative, alert, awake.

## 2021-03-23 NOTE — PLAN OF CARE
Patient presented with hallucinations, psychosis and syncope.   She has known meningioma and left eye blindness from left optic nerve neuropathy  Her head CT was unremarkable except for known nodule arising from the skull between the L sphenoid ridge and left anterior clinoid process.     Prior MRA of the head in Care Everywhere:     [facility]  1. HEAD MRI WITHOUT AND WITH IV CONTRAST  2. HEAD MRA WITHOUT IV CONTRAST  1/19/2017 6:05 AM     INDICATION: Abnormal Findings Other Study   TECHNIQUE:   1. Head MRI without and with intravenous contrast.  2. 3D time-of-flight head MRA without intravenous contrast.  CONTRAST: Gadavist 8mls (accession [id number]), *None (accession [id number])  COMPARISON: 01/18/2017     FINDINGS:   HEAD MRI: Corresponding to the abnormality in the left paraclinoid region, there is a 2.0 x 1.5 x 1.1 cm dural based heterogeneously enhancing extra-axial mass arising along the left aspect of the planum sphenoidale in the paraclinoid region. This is marginated medially and superiorly by the distal left internal carotid artery and the proximal left M1 segment without evidence of significant mass effect or distortion of the vessel. There is some mass effect upon the overlying anterior-inferior left frontal lobe. No definite associated edema. There is no restricted diffusion. There is a partially empty pituitary sella which is typically incidental. The pineal region is unremarkable. The orbits, paranasal sinuses and soft tissues of skull base are unremarkable. The major intracranial vascular flow voids are intact through the skull base. There is mild cerebral volume loss. There are mild to moderate T2 signal changes in the supratentorial white matter. There are multiple areas of hemosiderin blooming in the bilateral cerebral hemispheres.    HEAD MRA: There is no major intracranial flow-limiting arterial stenosis or occlusion. Within the limits of MRA, there is no convincing intracranial  aneurysm or high-flow vascular lesion.    CONCLUSION:  HEAD MRI:   1.  Corresponding to the abnormality on recent head CT, there is a 2 x 1.5 x 1.1 cm heterogeneously enhancing dural based mass in the left paraclinoid region. This is most consistent with a meningioma. Potential association with the left optic nerve is difficult to determine.  2.  No recent infarct.  3.  Underlying mild cerebral volume loss and evidence of mild to moderate chronic small vessel ischemic change.  4.  There are multiple foci of hemosiderin blooming in the bilateral cerebral hemispheres consistent with foci of chronic microhemorrhage of indeterminate etiology. Considerations would include chronic hypertension and amyloid angiopathy.    HEAD MRA:   1.  Unremarkable head MRA.
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
## 2021-03-23 NOTE — PLAN OF CARE
Observation goals PRIOR TO DISCHARGE       Comments: List all  goals to be met before discharge:     - Diagnostic tests and consults completed and resulted -Not met    - No further episodes of syncope and any new arrhythmia addressed with controlled heart rates -Met    - Vital signs normal or at patient baseline and orthostatic vitals are normal and patient not lightheaded with standing -Met    - Tolerating oral intake to maintain hydration -Met    - Safe disposition plan has been identified -Met-plan is back to prior living environment    - Nurse to notify provider when observation goals have been met and patient is ready for discharge.        Summary:     Pt is alert but confused, continue to whisper to herself and rambling.Tigrinya speaking, no english at all, jabber in room, does not understand American.Up with SBA, amb to bathroom and voiding O.K,tele-SR,low fat Na+ diet, no caffeine,plan is Echo today and possible discharge to prior.
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
## 2021-03-23 NOTE — UTILIZATION REVIEW
Concurrent stay review; Secondary Review Determination     [facility]          Under the authority of the Utilization Management Committee, the utilization review process indicated a secondary review on the above patient.  The review outcome is based on review of the medical records, discussions with staff, and applying clinical experience noted on the date of the review.          (x) Observation Status Appropriate - Concurrent stay review    RATIONALE FOR DETERMINATION   74 yo woman presents with episode of syncope while at the airport on the day of admission.  Work-up on admission largely benign, with an EKG that shows normal sinus rhythm, her CT head was without any acute intracranial pathology.  Hemodynamically stable. Suspect likely vaso-vagal episode. Low suspicion for seizure. ACS unlikely.    The severity of illness, intensity of service provided, expected LOS and risk for adverse outcome make the care appropriate for observation.      This document was produced using voice recognition software       The information on this document is developed by the utilization review team in order for the business office to ensure compliance.  This only denotes the appropriateness of proper admission status and does not reflect the quality of care rendered.         The definitions of Inpatient Status and Observation Status used in making the determination above are those provided in the CMS Coverage Manual, Chapter 1 and Chapter 6, section 70.4.      Sincerely,     ANEL SOTO MD    System Medical Director  Utilization Management  [facility].

## 2021-03-23 NOTE — PROGRESS NOTES
Observation goals PRIOR TO DISCHARGE    Comments: List all  goals to be met before discharge:   - Diagnostic tests and consults completed and resulted -NOT MET  - No further episodes of syncope and any new  arrhythmia addressed with controlled heart rates -MET  - Vital signs normal or at patient baseline and orthostatic vitals are normal and patient not lightheaded with standing -MET  - Tolerating oral intake to maintain hydration -MET  - Safe disposition plan has been identified -NOT MET
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
## 2021-03-23 NOTE — PLAN OF CARE
Observation goals PRIOR TO DISCHARGE       Comments: List all  goals to be met before discharge:     - Diagnostic tests and consults completed and resulted -Not met    - No further episodes of syncope and any new arrhythmia addressed with controlled heart rates -Met    - Vital signs normal or at patient baseline and orthostatic vitals are normal and patient not lightheaded with standing -Met    - Tolerating oral intake to maintain hydration -Met    - Safe disposition plan has been identified -Met-plan is back to prior living environment    - Nurse to notify provider when observation goals have been met and patient is ready for discharge.

## 2021-03-24 ENCOUNTER — APPOINTMENT (OUTPATIENT)
Dept: PHYSICAL THERAPY | Facility: CLINIC | Age: 76
End: 2021-03-24
Attending: INTERNAL MEDICINE
Payer: MEDICAID

## 2021-03-24 ENCOUNTER — APPOINTMENT (OUTPATIENT)
Dept: GENERAL RADIOLOGY | Facility: CLINIC | Age: 76
End: 2021-03-24
Attending: INTERNAL MEDICINE
Payer: MEDICAID

## 2021-03-24 PROCEDURE — 250N000013 HC RX MED GY IP 250 OP 250 PS 637: Performed by: STUDENT IN AN ORGANIZED HEALTH CARE EDUCATION/TRAINING PROGRAM

## 2021-03-24 PROCEDURE — 250N000013 HC RX MED GY IP 250 OP 250 PS 637: Performed by: PSYCHIATRY & NEUROLOGY

## 2021-03-24 PROCEDURE — G0378 HOSPITAL OBSERVATION PER HR: HCPCS

## 2021-03-24 PROCEDURE — 250N000013 HC RX MED GY IP 250 OP 250 PS 637: Performed by: INTERNAL MEDICINE

## 2021-03-24 PROCEDURE — 72100 X-RAY EXAM L-S SPINE 2/3 VWS: CPT

## 2021-03-24 PROCEDURE — 99225 PR SUBSEQUENT OBSERVATION CARE,LEVEL II: CPT | Performed by: INTERNAL MEDICINE

## 2021-03-24 PROCEDURE — 99203 OFFICE O/P NEW LOW 30 MIN: CPT | Performed by: PSYCHIATRY & NEUROLOGY

## 2021-03-24 PROCEDURE — 99207 PR CONSULT E&M CHANGED TO INITIAL LEVEL: CPT | Performed by: PSYCHIATRY & NEUROLOGY

## 2021-03-24 PROCEDURE — 72072 X-RAY EXAM THORAC SPINE 3VWS: CPT

## 2021-03-24 PROCEDURE — 97161 PT EVAL LOW COMPLEX 20 MIN: CPT | Mod: GP

## 2021-03-24 RX ORDER — MIRTAZAPINE 7.5 MG/1
7.5 TABLET, FILM COATED ORAL AT BEDTIME
Status: DISCONTINUED | OUTPATIENT
Start: 2021-03-24 | End: 2021-03-25

## 2021-03-24 RX ORDER — LISINOPRIL 5 MG/1
5 TABLET ORAL DAILY
Status: DISCONTINUED | OUTPATIENT
Start: 2021-03-24 | End: 2021-03-25

## 2021-03-24 RX ORDER — RISPERIDONE 0.25 MG/1
1 TABLET ORAL 2 TIMES DAILY
Status: DISCONTINUED | OUTPATIENT
Start: 2021-03-24 | End: 2021-03-25

## 2021-03-24 RX ADMIN — LISINOPRIL 5 MG: 5 TABLET ORAL at 09:56

## 2021-03-24 RX ADMIN — ACETAMINOPHEN 650 MG: 325 TABLET, FILM COATED ORAL at 09:56

## 2021-03-24 RX ADMIN — DICLOFENAC SODIUM 4 G: 10 GEL TOPICAL at 19:21

## 2021-03-24 RX ADMIN — AMLODIPINE BESYLATE 5 MG: 5 TABLET ORAL at 09:55

## 2021-03-24 RX ADMIN — RISPERIDONE 1 MG: 0.25 TABLET ORAL at 11:45

## 2021-03-24 RX ADMIN — RISPERIDONE 1 MG: 0.25 TABLET ORAL at 19:20

## 2021-03-24 RX ADMIN — MIRTAZAPINE 7.5 MG: 7.5 TABLET, FILM COATED ORAL at 21:58

## 2021-03-24 NOTE — PROGRESS NOTES
SPIRITUAL HEALTH SERVICES Progress Note  [facility] OBS    Visited pt due to staff request for SH visit. I checked in with OBS staff and they set up Jabber device in pt room.     Via Jabber  I affirmed with pt our care for her and she asked if I was staying with her all day. I let her know I was there to provide support for her and through , found out that pt is Mormonism and Felton welcomed prayer. I offered prayer and pt then recited the Lord's Prayer in her language and I offered words of our care and support of her during her stay.    SHS remains available.      Zulma Deluca  Chaplain Resident

## 2021-03-24 NOTE — PLAN OF CARE
OT: Orders received. Chart reviewed and discussed with care team, including IP PT.  Per discussion with care team, pt is at baseline for I/ADLs. No IP OT warranted.   Defer discharge recommendations to IP PT.  Will complete orders.
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
## 2021-03-24 NOTE — PLAN OF CARE
Observation goals PRIOR TO DISCHARGE    List all  goals to be met before discharge:   - Diagnostic tests and consults completed and resulted=NOT MET  - No further episodes of syncope and any new arrhythmia addressed with controlled heart rates=MET  - Vital signs normal or at patient baseline and orthostatic vitals are normal and patient not lightheaded with standing =MET  - Tolerating oral intake to maintain hydration=MET  - Safe disposition plan has been identified=NOT MET  - Nurse to notify provider when observation goals have been met and patient is ready for discharge.         Pt is alert but confused, continue to whisper to herself and rambling.Tigrinya speaking, no english at all, jabber in room, does not understand Seneg[place]e.Up with SBA, amb to bathroom and voiding O.K,tele-SR,low fat Na+ diet, no caffeine,Will monitor

## 2021-03-24 NOTE — PROGRESS NOTES
Observation goals PRIOR TO DISCHARGE     List all  goals to be met before discharge:   - Diagnostic tests and consults completed and resulted=NOT MET  - No further episodes of syncope and any new arrhythmia addressed with controlled heart rates=MET  - Vital signs normal or at patient baseline and orthostatic vitals are normal and patient not lightheaded with standing =MET  - Tolerating oral intake to maintain hydration=MET  - Safe disposition plan has been identified=NOT MET  - Nurse to notify provider when observation goals have been met and patient is ready for discharge.        Pt admitted after syncopal episode. Needs interpretor. TWIN helps during days. A&OX1-2. Reports possible hallucinations.  Ax1 with G/B and walker, sets off bed alarm versus using call light. O2=RA. Diet=regular vegan but fasts until 2pm.  Cont of B&B. EEG in process. Psych consult planned. Neuro consult cancelled. Discharge planning pending.

## 2021-03-24 NOTE — PLAN OF CARE
DATE & TIME: 3/23/21 0423-0216   Cognitive Concerns/ Orientation : AOx2   BEHAVIOR & AGGRESSION TOOL COLOR: Green  CIWA SCORE: NA   ABNL VS/O2: HTN, neg orthos, other VSS  MOBILITY: Ax1 W/GB  PAIN MANAGMENT: tylenol x1  DIET: Reg -- Vegan  BOWEL/BLADDER: Cont  ABNL LAB/BG: None  DRAIN/DEVICES: NA  TELEMETRY RHYTHM: NSR  SKIN: WDL  TESTS/PROCEDURES: pt having xray this afternoon.  D/C DAY/GOALS/PLACE: pending pt progress  OTHER IMPORTANT INFO:   Psych consult completed, Pt started on Risperidone and Remeron.  Observation goals PRIOR TO DISCHARGE    Comments: List all  goals to be met before discharge:   - Diagnostic tests and consults completed and resulted -NOT MET (pt going for xrays this afternoon)  - No further episodes of syncope and any new  arrhythmia addressed with controlled heart rates -MET  - Vital signs normal or at patient baseline and orthostatic vitals are normal and patient not lightheaded with standing -PARTIALLY MET (pt c/o some lightheadedness when standing)  - Tolerating oral intake to maintain hydration -MET  - Safe disposition plan has been identified -NOT MET (geripsych or home with son)

## 2021-03-24 NOTE — CONSULTS
Care Management Initial Consult    General Information  Assessment completed with: pending       Primary Care Provider verified and updated as needed:     Readmission within the last 30 days:        Reason for Consult: discharge planning  Advance Care Planning:            Communication Assessment  Patient's communication style: spoken language (non-English)(Tigrina)    Hearing Difficulty or Deaf: no   Wear Glasses or Blind: no    Cognitive  Cognitive/Neuro/Behavioral: .WDL except  Level of Consciousness: confused, alert  Arousal Level: opens eyes spontaneously  Orientation: disoriented to, situation  Mood/Behavior: cooperative  Best Language: 0 - No aphasia  Speech: rambling, whispers    Living Environment:   People in home: child(xochilt), dependent     Current living Arrangements: apartment      Able to return to prior arrangements:  Per hand off from medical team, family indicating they are not comfortable with her coming home in her current state, stating 'she is possessed'.       Family/Social Support:  Care provided by: child(xochilt)  Provides care for:       Children          Description of Support System:           Current Resources:   Patient receiving home care services:       Community Resources:    Equipment currently used at home: walker, rolling, wheelchair, manual  Supplies currently used at home:      Employment/Financial:  Employment Status:          Financial Concerns:             Lifestyle & Psychosocial Needs:        Socioeconomic History     Marital status:      Spouse name: Not on file     Number of children: Not on file     Years of education: Not on file     Highest education level: Not on file          Functional Status:  Prior to admission patient needed assistance:              Mental Health Status:          Chemical Dependency Status:                Values/Beliefs:  Spiritual, Cultural Beliefs, Mosque Practices, Values that affect care:                 Additional Information:  KASHMIR  consulted to assist with discharge planning, emotional support and transportation arrangements.  SW reviewed patient chart and discussed in rounds.  Pt is a 75 yr old non-english speaking female who admitted to Observation on 3/22/21 after a psychotic and syncopal event while at the airport. Psychiatry is recommending IP Psych transfer to [facility].   SW attempted calling son-in-law x2 with no answer and full voicemail box. SW to continue attempts to reach TWIN and work with team for placement.    SW to continue to follow and assist with discharge planning.    RADHA Villatoro  Daytime (8:00am-4:30pm): [phone]  After-Hours SW Pager (4:30pm-11:30pm): [phone]         RADHA Ayala

## 2021-03-24 NOTE — PROGRESS NOTES
Care Management Follow Up    Length of Stay (days): 0    Expected Discharge Date: 03/25/21     Concerns to be Addressed: discharge planning     Patient plan of care discussed at interdisciplinary rounds: Yes    Anticipated Discharge Disposition: Home Care  Disposition Comments: The pt's dtg that manages her cares and knows the pt's  and insurance is in [place].  Anticipated Discharge Services: PCA(The pt had had a PCA that stopped services d/t Covid and they never resumed.  The pt's Son-in-Law would like PCA services to resume.)  Anticipated Discharge DME: Wheelchair, Walker    Patient/family educated on Medicare website which has current facility and service quality ratings: yes  Education Provided on the Discharge Plan:    Patient/Family in Agreement with the Plan: yes    Referrals Placed by CM/SW: Communication hand-offs to next level of Care Providers, Financial Services, Homecare  Private pay costs discussed: Not applicable    Additional Information:  Per SW the pt's TWIN is requesting home care.  I called him and he stated that the pt was receiving home care and/or PCA and it stopped a year ago d/t covid. I spoke with Chepe regarding home care choices and he would like us to set up home care through [facility].   I emailed a referral to [facility] dept and our Liaison Ana Held.       I called the pt's PCP (and updated the correct PCP into [facility]), [facility] Estee Yeager PA-C.  They have the pt listed as having MA only and they have not records of Home Care ever being ordered for this pt. The pt's last appointment was April 2020 and she did not answer her phone for her last scheduled appointment.    The pt's PCP is not available until April 8th.  I made the first in clinic appointment available for the pt and added to the AVS:  You have a Follow up Appointment with Dr. Cheung at [facility] on Wednesday March 31st at  2:00PM.  Address :  [address] [place] [address]  [id number]  If you have any questions about this appointment or need to reschedule it please call the clinic at Phone Number [phone].    I did send an email to our Financial Office to find out the pt's MA information.  Her TWIN does not know it and the pt is in a psychotic state and unable to give us this information.    The pt's TWIN stated they do not want the pt on any psychiatric medications and they do prayer only for her Mental Health issues.      Upon discharge would need orders for Home Care RN to Initiate Care on April 1st (post PCP appointment) and a F2F.      The pt will discharge either today or tomorrow.      Care Coordination will continue to follow for home care referral.        Juana Veloz RN, BSN Care Coordinator  [facility]  Mobile: [phone]
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
## 2021-03-24 NOTE — CONSULTS
"Consult Date:  2021      PSYCHIATRY CONSULTATION      REQUESTING PHYSICIAN:  Dr. Londono      REASON FOR CONSULTATION:  Psychosis/depression.      IDENTIFYING DATA:  The patient is a 75-year-old  [place] woman with a history of psychosis, who was [place] the airport in an agitated state.  She was apparently going to [facility] with her daughter, who has since traveled there.  She had some sort of a fall, and when they assessed her in the emergency room, they were concerned that she was confused/psychotic.  She is convalescing on the observation unit.      CHIEF COMPLAINT:  \"I hear voices.\"      HISTORY OF PRESENT ILLNESS:  The patient is a 75-year-old [place] woman who was assessed via an , because she does not speak English.  I understand that she was at the airport and was supposed to go to [facility] and then had a syncopal episode.  From what I can tell,  the family does not want to take her home and they have been concerned that she is not well.  She has a total of 8 children, but 5 are .  In the past, she has seen Dr. Riggs, who is a psychiatrist, and has been diagnosed with a psychotic depression.  She has been on Zoloft and Risperdal in the past, but not taking these medications.  She makes comments about \"being possessed by the devil\" during \"spells\" that family describe.  She has had trouble with sleep.  She looks a bit wide-eyed.  She is oriented.  She talks about being possessed and says that she hears voices.  She acknowledges sleep is difficult.  She remembers taking medication to help with the voices, but has not been taking it recently.  Currently, we do not have any additional family members available for interview.  Her  does not speak English.  Apparently, she has a son-in-law that might be involved and 3 living children.  It is not clear to me that any of these individuals speak English.      I did review records through [facility] to see Dr." Keely's notes.  It was not entirely clear to me if she has been admitted to a hospital for this, but it is clear for the last several years she has been diagnosed with psychotic depression and has been noncompliant with medication.  The patient does not want to leave the hospital.  She says she does not feel well because of the voices.  She denies current thoughts of wanting to hurt herself.      PAST PSYCHIATRIC HISTORY:  As above.  The details are limited, but a diagnosis of psychotic depression is postulated per record review with previous treatment using Zoloft and Risperdal.      PAST CHEMICAL DEPENDENCY HISTORY:  Negative.      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      PRIOR TO ADMISSION MEDICATIONS:  Listed as Zoloft, Risperdal, vitamin D3, oxybutynin, Drisdol and gabapentin, though I am not convinced she has been compliant with any of them.  She may have been taking some Benadryl for sleep.      FAMILY HISTORY:  Unknown.      SOCIAL HISTORY:  The patient is .  She had 8 children.  She is from [facility] and was apparently trying to get back to that country when she had a mishap at the airport that landed her here on the observation unit.  She was acting strangely at the airport.  I believe she has 3 living children, but we have been unable to communicate with them effectively, because of the language barrier.      REVIEW OF SYSTEMS:  A 10-point review of systems is unchanged from Dr. Lipscomb's note 03/22/2021 at 7:57 p.m.      MOST RECENT VITAL SIGNS:  Blood pressure 150/92, temperature 98.7, pulse 73, respiratory rate 18, oxygen saturation 96%.      MENTAL STATUS EXAMINATION:  Appearance:  The patient is an elderly woman lying in bed.  She has a bit of a wide-eyed stare.  Speech is of normal rate, flow and tone, though articulation is poor due to poor dentition.  She has a thick [place] accent  and apparently speaks Tingua.  Use of language appropriate.  Motor exam is calm.  She is huddled under her bed sheets.  Coordination, station and gait not tested.  Muscle strength and tone adequate.  Affect is flat.  Mood dysphoric.  She does articulate being depressed.  Thought content:  Positive for auditory hallucinations and feelings that she might be possessed by a demon.  She denies specific thoughts of wanting to hurt self or others.  Insight and judgment are poor.  Cognitive exam:  The patient is alert, oriented x 3.  Concentration is poor.  She looks distracted.  Recent and remote memory grossly intact, as she is able to answer questions appropriately through the .  [employer] fund of knowledge fair.      IMPRESSION:  The patient is a 75-year-old Croatian female who appears to have signs of psychosis, historically a psychotic depression.  She is willing to take Risperdal, which I will initiate, and we talked about Remeron for sleep.  I think that Geriatric Psychiatry referral to [facility] would be appropriate.  She does not have any signs of dementia, and given her current level of psychosis, social dysfunction, trying to send her home from the hospital is going to be very difficult.  Family is expressing concern that they do not feel that she can come home with this behavior.      DIAGNOSES:   1.  Unspecified psychotic disorder.   2.  Rule out major depressive disorder with psychotic features by history.      PLAN:   1.  Start Risperdal 1 mg b.i.d., 7.5 mg of Remeron at bedtime.   2.  Would suggest referral through [facility] for Geriatric Psychiatry placement.  [facility] inpatient program would be appropriate to allow further stabilization and medication intervention.  She has no signs of cognitive impairment or dementia that would preclude transfer there.   3.  I did ask that the Social Work staff coordinate with family who apparently are indicating they do not feel comfortable taking the  patient home.  There is a significant language barrier that has made communication difficult, though I did the best I could today talking with the  through use of a Jabber.         JUSTIN ROSE MD             D: 2021   T: 2021   MT: VICKIE      Name:     QUINTIN SHELL   MRN:      [id number]        Account:       [id number]   :      1945           Consult Date:  2021      Document: [id number]

## 2021-03-24 NOTE — PROGRESS NOTES
Observation goals PRIOR TO DISCHARGE    Comments: List all  goals to be met before discharge:   - Diagnostic tests and consults completed and resulted -PARTIALLY MET    - No further episodes of syncope and any new arrhythmia addressed with controlled heart rates -MET    - Vital signs normal or at patient baseline and orthostatic vitals are normal and patient not lightheaded with standing -PARTIALLY MET (Pt c/o some lightheadedness when standing)    - Tolerating oral intake to maintain hydration -MET    - [facility]

## 2021-03-24 NOTE — PROGRESS NOTES
Observation goals PRIOR TO DISCHARGE    Comments: List all  goals to be met before discharge:     - Diagnostic tests and consults completed and resulted : Not met    - No further episodes of syncope and any new arrhythmia addressed with controlled heart rates : Met     - Vital signs normal or at patient baseline and orthostatic vitals are normal and patient not lightheaded with standing : partially met, -160    - Tolerating oral intake to maintain hydration : Met    - Safe disposition plan has been identified : Not met

## 2021-03-24 NOTE — PROGRESS NOTES
Pt is Tigrinyan speaking. Using jabber with South African speaking, pt at times unable to understand or follow. Disoriented to time and situation. Whispers and rambles to herself. Elevated BP's, afebrile on RA. Continent of B&B. IV is SL. Ambulates SBA with walker. Diet low fat Na+ diet, vegan. Echo done and resulted, EEG done, pending. Tele: SR. Discharge pending.     Observation goals PRIOR TO DISCHARGE    Comments: List all  goals to be met before discharge:     - Diagnostic tests and consults completed and resulted : Not met    - No further episodes of syncope and any new arrhythmia addressed with controlled heart rates : Met     - Vital signs normal or at patient baseline and orthostatic vitals are normal and patient not lightheaded with standing : partially met, -160    - Tolerating oral intake to maintain hydration : Met    - Safe disposition plan has been identified : Not met

## 2021-03-25 VITALS
RESPIRATION RATE: 18 BRPM | HEART RATE: 66 BPM | DIASTOLIC BLOOD PRESSURE: 85 MMHG | WEIGHT: 189.5 LBS | TEMPERATURE: 97.8 F | SYSTOLIC BLOOD PRESSURE: 145 MMHG | OXYGEN SATURATION: 98 %

## 2021-03-25 LAB
ANION GAP SERPL CALCULATED.3IONS-SCNC: 3 MMOL/L (ref 3–14)
BASOPHILS # BLD AUTO: 0 10E9/L (ref 0–0.2)
BASOPHILS NFR BLD AUTO: 0.6 %
BUN SERPL-MCNC: 9 MG/DL (ref 7–30)
CALCIUM SERPL-MCNC: 8.6 MG/DL (ref 8.5–10.1)
CHLORIDE SERPL-SCNC: 108 MMOL/L (ref 94–109)
CO2 SERPL-SCNC: 28 MMOL/L (ref 20–32)
CREAT SERPL-MCNC: 0.52 MG/DL (ref 0.52–1.04)
DIFFERENTIAL METHOD BLD: ABNORMAL
EOSINOPHIL # BLD AUTO: 0.1 10E9/L (ref 0–0.7)
EOSINOPHIL NFR BLD AUTO: 2.8 %
ERYTHROCYTE [DISTWIDTH] IN BLOOD BY AUTOMATED COUNT: 13.6 % (ref 10–15)
GFR SERPL CREATININE-BSD FRML MDRD: >90 ML/MIN/{1.73_M2}
GLUCOSE SERPL-MCNC: 126 MG/DL (ref 70–99)
HCT VFR BLD AUTO: 38.3 % (ref 35–47)
HGB BLD-MCNC: 12 G/DL (ref 11.7–15.7)
IMM GRANULOCYTES # BLD: 0 10E9/L (ref 0–0.4)
IMM GRANULOCYTES NFR BLD: 0 %
LYMPHOCYTES # BLD AUTO: 1.4 10E9/L (ref 0.8–5.3)
LYMPHOCYTES NFR BLD AUTO: 39.8 %
MCH RBC QN AUTO: 26.7 PG (ref 26.5–33)
MCHC RBC AUTO-ENTMCNC: 31.3 G/DL (ref 31.5–36.5)
MCV RBC AUTO: 85 FL (ref 78–100)
MONOCYTES # BLD AUTO: 0.4 10E9/L (ref 0–1.3)
MONOCYTES NFR BLD AUTO: 12.4 %
NEUTROPHILS # BLD AUTO: 1.6 10E9/L (ref 1.6–8.3)
NEUTROPHILS NFR BLD AUTO: 44.4 %
NRBC # BLD AUTO: 0 10*3/UL
NRBC BLD AUTO-RTO: 0 /100
PLATELET # BLD AUTO: 263 10E9/L (ref 150–450)
POTASSIUM SERPL-SCNC: 3.7 MMOL/L (ref 3.4–5.3)
RBC # BLD AUTO: 4.49 10E12/L (ref 3.8–5.2)
SODIUM SERPL-SCNC: 139 MMOL/L (ref 133–144)
WBC # BLD AUTO: 3.5 10E9/L (ref 4–11)

## 2021-03-25 PROCEDURE — 250N000013 HC RX MED GY IP 250 OP 250 PS 637: Performed by: INTERNAL MEDICINE

## 2021-03-25 PROCEDURE — 99217 PR OBSERVATION CARE DISCHARGE: CPT | Performed by: INTERNAL MEDICINE

## 2021-03-25 PROCEDURE — 85025 COMPLETE CBC W/AUTO DIFF WBC: CPT | Performed by: INTERNAL MEDICINE

## 2021-03-25 PROCEDURE — 80048 BASIC METABOLIC PNL TOTAL CA: CPT | Performed by: INTERNAL MEDICINE

## 2021-03-25 PROCEDURE — G0378 HOSPITAL OBSERVATION PER HR: HCPCS

## 2021-03-25 PROCEDURE — 36415 COLL VENOUS BLD VENIPUNCTURE: CPT | Performed by: INTERNAL MEDICINE

## 2021-03-25 RX ORDER — AMLODIPINE BESYLATE 5 MG/1
5 TABLET ORAL DAILY
Qty: 30 TABLET | Refills: 0 | Status: SHIPPED | OUTPATIENT
Start: 2021-03-25

## 2021-03-25 RX ORDER — AMLODIPINE BESYLATE 5 MG/1
5 TABLET ORAL DAILY
Status: DISCONTINUED | OUTPATIENT
Start: 2021-03-25 | End: 2021-03-25 | Stop reason: HOSPADM

## 2021-03-25 RX ORDER — GABAPENTIN 300 MG/1
300 CAPSULE ORAL 3 TIMES DAILY
Status: DISCONTINUED | OUTPATIENT
Start: 2021-03-25 | End: 2021-03-25 | Stop reason: HOSPADM

## 2021-03-25 RX ADMIN — AMLODIPINE BESYLATE 5 MG: 5 TABLET ORAL at 08:39

## 2021-03-25 RX ADMIN — DICLOFENAC SODIUM 4 G: 10 GEL TOPICAL at 08:39

## 2021-03-25 RX ADMIN — GABAPENTIN 300 MG: 300 CAPSULE ORAL at 08:39

## 2021-03-25 NOTE — PLAN OF CARE
Date & Time: 3/25 7510-2203  Diagnosis: Psychosis   Procedures: NA  Orientation/Cognitive: AOx1, confused, Tristanian speaking - tigrinya dialect   VS/O2: VSS ex slight HTN, RA   Mobility: SBA + gb/walker   Diet: Cardiac/vegan   Pain Management: Chronic back pain & knee pain - scheduled gabapentin & volatren gel   Bowel & Bladder: Continent   Skin: WDL   Abnormal Labs: , WBC 3.5  Tele: NA  IV Access/Drips/Fluids: No IV access  Drains: NA  Tests: Xrays -, EEG - mild encephalopathy, EKG - NSR   Consults: Psych, hospitalist, SW   Discharge Plan: Home with son in law 3/25 w/ home care & therapies   Other: L eye blindness    Discharge instructions & safety reviewed with patients TWIN over the phone. All questions answered. Discharge medication reviewed & given to patient/patient's TWIN. Discharged home with TWIN.

## 2021-03-25 NOTE — PLAN OF CARE
Covid negative.  Alert. Makes needs known.   AVSS/RA.  Denies pain.  No iv access.  Am labs.  Psych and SW following.  Assist 1/gait belt/walker.

## 2021-03-25 NOTE — PLAN OF CARE
Alert to self and time. VSS on room air. Vegan regular diet, adequate appetite. Psych consulted with patient, started on Remeron and risperidone. Denies chest pain and SOB. SBA with walker and gait belt. Tele: SR. Xray completed, discharge pending. SW following.

## 2021-03-25 NOTE — PLAN OF CARE
Observation goals PRIOR TO DISCHARGE     Comments: List all  goals to be met before discharge:   - Diagnostic tests and consults completed and resulted  Partially Met  - No further episodes of syncope and any new arrhythmia addressed with controlled heart rates   Met  - Vital signs normal or at patient baseline and orthostatic vitals are normal and patient not lightheaded with standing   Partially Met  - Tolerating oral intake to maintain hydration   Met  - Safe disposition plan has been identified   Not Met  - Nurse to notify provider when observation goals have been met and patient is ready for discharge.  Yes

## 2021-03-25 NOTE — PROGRESS NOTES
Care Management Follow Up    Length of Stay (days): 0    Expected Discharge Date: 03/25/21     Concerns to be Addressed: discharge planning     Patient plan of care discussed at interdisciplinary rounds: Yes    Anticipated Discharge Disposition: Home Care  Disposition Comments: The pt's dtg that manages her cares and knows the pt's  and insurance is in [place].  Anticipated Discharge Services: PCA(The pt had had a PCA that stopped services d/t Covid and they never resumed.  The pt's Son-in-Law would like PCA services to resume.)  We are not sure what these services where, cannot find documentation of them, and the TWIN does not know anything about them.  Anticipated Discharge DME: Wheelchair, Walker    Patient/family educated on Medicare website which has current facility and service quality ratings: yes  Education Provided on the Discharge Plan:    Patient/Family in Agreement with the Plan: yes    Referrals Placed by CM/SW: Communication hand-offs to next level of Care Providers, Financial Services, Homecare  Private pay costs discussed: Not applicable    Additional Information:  [facility] cannot accept the pt for home care because they will not accept Psychiatric Patients.     We still do not know who provided her home services her TWIN is referencing.    He is going to speak with his wife who is now in [place] to find out information from her hopefully sometime today.  I will continue to search for a home care company for the pt.  The pt will not be able to start home care until after her PCP appointment, as stated in my pervious note, until April 1st.      Would this pt benefit from a Psychiatric eval even though the family does not want medication to help establish her needs and qualify her for community resources?    Care Coordination will continue to work on this case to set up home care.  The pt can discharge to home with her son and we can still work on home care.  She will need a F2F and Home Care  RN orders.     Juana Veloz RN, BSN Care Coordinator  [facility]  Mobile: [phone]
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
## 2021-03-26 NOTE — PROGRESS NOTES
"Care Management Discharge Note    Discharge Date: 03/25/21       Discharge Disposition: Home Care    Discharge Services: PCA(The pt had had a PCA that stopped services d/t Covid and they never resumed.  The pt's Son-in-Law would like PCA services to resume.)    Discharge DME: Wheelchair, Walker    Discharge Transportation: family or friend will provide    Private pay costs discussed: Not applicable    PAS Confirmation Code:  NA  Patient/family educated on Medicare website which has current facility and service quality ratings: yes  Pt/family was given the Medicare Compare List for Home Care, with associated star ratings to assist with choices for referrals/discharge planning Yes  Education was given to pt/family that star ratings are updated/maintained by Medicare and can be reviewed by visiting www.medicare.gov Yes    Education Provided on the Discharge Plan:  Yes  Persons Notified of Discharge Plans: TWIN aware we are still working on setting up home care and that it cannot initiate until after she sees her PCP on March 31st.    Patient/Family in Agreement with the Plan: yes    Handoff Referral Completed: Yes    Additional Information:  The pt was not accepted by [facility] due to her mental health issues.  The pt's son-in-law is aware of this and the fact we are placing other referral for home care.  I am sending a handoff to the pt's PCP to help f/u with home care referrals if needed.  I faxed a referral to [facility] and spoke with allan Iqbal [phone] and fax [phone].  I have not hear back from her and I LVM with her and faxed the home care Referral Discharge via [email or web address].    The pt is discharging to home with her TWIN.  Her dtg who flew to [facility] at the time this pt [place] her [place] has not called me.  The plan was for her to call me and update me on \"home Care/PCA\" services the TWIN is stating the pt was receiving up until a year ago.  He states these services stopped because of covid.  As " stated in my other not the TWIN, the pt's PCP, and no other family members except her dtg who flew to [place] know who provided these services.        Juana Veloz RN, BSN Care Coordinator  [facility]  Mobile: [phone]

## 2022-04-07 ENCOUNTER — OFFICE VISIT (OUTPATIENT)
Dept: URGENT CARE | Facility: URGENT CARE | Age: 77
End: 2022-04-07
Payer: MEDICAID

## 2022-04-07 VITALS
WEIGHT: 189 LBS | TEMPERATURE: 98.5 F | SYSTOLIC BLOOD PRESSURE: 130 MMHG | OXYGEN SATURATION: 98 % | HEART RATE: 75 BPM | DIASTOLIC BLOOD PRESSURE: 71 MMHG

## 2022-04-07 DIAGNOSIS — L08.9 SKIN INFECTION: ICD-10-CM

## 2022-04-07 DIAGNOSIS — T23.221A PARTIAL THICKNESS BURN OF FINGER OF RIGHT HAND, INITIAL ENCOUNTER: Primary | ICD-10-CM

## 2022-04-07 PROCEDURE — 99203 OFFICE O/P NEW LOW 30 MIN: CPT | Performed by: PHYSICIAN ASSISTANT

## 2022-04-07 RX ORDER — SILVER SULFADIAZINE 10 MG/G
CREAM TOPICAL DAILY
Qty: 85 G | Refills: 0 | Status: SHIPPED | OUTPATIENT
Start: 2022-04-07

## 2022-04-07 RX ORDER — SULFAMETHOXAZOLE/TRIMETHOPRIM 800-160 MG
1 TABLET ORAL 2 TIMES DAILY
Qty: 20 TABLET | Refills: 0 | Status: SHIPPED | OUTPATIENT
Start: 2022-04-07 | End: 2022-04-17

## 2022-04-07 RX ORDER — LIDOCAINE 50 MG/G
OINTMENT TOPICAL PRN
Qty: 50 G | Refills: 0 | Status: SHIPPED | OUTPATIENT
Start: 2022-04-07
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
## 2022-04-07 NOTE — PATIENT INSTRUCTIONS
Patient Education     Infected Burn, with Cream or Ointment and Dressing   Your burn has become infected. This is often because skin germs (bacteria) have gotten into the burn area.  Home care  Follow these guidelines when caring for yourself at home:    Change your dressing as directed by your healthcare provider. If the bandage sticks, soak it off in warm water. A bandage left in place too long can make the infection worse.    Wash the area with soap and water to remove all cream, ointment, ooze, or scabs. You may do this in a sink, under a tub faucet, or in the shower. Rinse off the soap and pat dry with a clean towel. Look for signs of infection.    Apply antibiotic cream or ointment according to your healthcare provider's instructions. This will help prevent infection and keep the bandage from sticking.    Cover the burn with a nonstick gauze. Then wrap it with the bandage material.    If the bandage gets wet or dirty, change it.    You may use over-the-counter medicine to control pain, unless another pain medicine was prescribed. If you have chronic liver or kidney disease, talk with your provider before taking acetaminophen or ibuprofen. Also talk with your provider if you ve had a stomach ulcer or GI (gastrointestinal) bleeding. Don t give ibuprofen to children younger than 6 months old.  Follow-up care  Follow up with your healthcare provider, or as advised. The infection should not get worse once you start treatment. Check the burn in 1 to 2 days for the signs of worsening infection listed below.  When to seek medical advice  Call your healthcare provider right away if any of these occur:    Pain in the wound gets worse    Redness, swelling, or pus coming from the wound gets worse    Fever of 100.4  F (38.0 C) or higher,or as directed by your healthcare provider  [email or web address] last reviewed this educational content on 11/1/2019 2000-2021 [employer]. All rights reserved. This information is  not intended as a substitute for professional medical care. Always follow your healthcare professional's instructions.